# Patient Record
Sex: MALE | Race: WHITE
[De-identification: names, ages, dates, MRNs, and addresses within clinical notes are randomized per-mention and may not be internally consistent; named-entity substitution may affect disease eponyms.]

---

## 2018-01-15 ENCOUNTER — HOSPITAL ENCOUNTER (EMERGENCY)
Dept: HOSPITAL 17 - PHED | Age: 38
Discharge: HOME | End: 2018-01-15
Payer: COMMERCIAL

## 2018-01-15 VITALS
DIASTOLIC BLOOD PRESSURE: 111 MMHG | RESPIRATION RATE: 20 BRPM | SYSTOLIC BLOOD PRESSURE: 172 MMHG | HEART RATE: 87 BPM | OXYGEN SATURATION: 99 % | TEMPERATURE: 98.3 F

## 2018-01-15 VITALS — DIASTOLIC BLOOD PRESSURE: 95 MMHG | SYSTOLIC BLOOD PRESSURE: 155 MMHG

## 2018-01-15 DIAGNOSIS — N28.89: Primary | ICD-10-CM

## 2018-01-15 LAB
BACTERIA #/AREA URNS HPF: (no result) /HPF
BASOPHILS # BLD AUTO: 0 TH/MM3 (ref 0–0.2)
BASOPHILS NFR BLD: 0.3 % (ref 0–2)
BUN SERPL-MCNC: 13 MG/DL (ref 7–18)
CALCIUM SERPL-MCNC: 9 MG/DL (ref 8.5–10.1)
CHLORIDE SERPL-SCNC: 104 MEQ/L (ref 98–107)
COLOR UR: YELLOW
CREAT SERPL-MCNC: 0.97 MG/DL (ref 0.6–1.3)
EOSINOPHIL # BLD: 0.1 TH/MM3 (ref 0–0.4)
EOSINOPHIL NFR BLD: 0.9 % (ref 0–4)
ERYTHROCYTE [DISTWIDTH] IN BLOOD BY AUTOMATED COUNT: 13.4 % (ref 11.6–17.2)
GFR SERPLBLD BASED ON 1.73 SQ M-ARVRAT: 87 ML/MIN (ref 89–?)
GLUCOSE SERPL-MCNC: 97 MG/DL (ref 74–106)
GLUCOSE UR STRIP-MCNC: (no result) MG/DL
HCO3 BLD-SCNC: 27.7 MEQ/L (ref 21–32)
HCT VFR BLD CALC: 45.6 % (ref 39–51)
HGB BLD-MCNC: 14.2 GM/DL (ref 13–17)
HGB UR QL STRIP: (no result)
KETONES UR STRIP-MCNC: (no result) MG/DL
LEUKOCYTE ESTERASE UR QL STRIP: (no result) /HPF (ref 0–5)
LYMPHOCYTES # BLD AUTO: 1.6 TH/MM3 (ref 1–4.8)
LYMPHOCYTES NFR BLD AUTO: 19.9 % (ref 9–44)
MCH RBC QN AUTO: 25 PG (ref 27–34)
MCHC RBC AUTO-ENTMCNC: 31.2 % (ref 32–36)
MCV RBC AUTO: 80.1 FL (ref 80–100)
MONOCYTE #: 0.6 TH/MM3 (ref 0–0.9)
MONOCYTES NFR BLD: 7.5 % (ref 0–8)
NEUTROPHILS # BLD AUTO: 5.8 TH/MM3 (ref 1.8–7.7)
NEUTROPHILS NFR BLD AUTO: 71.4 % (ref 16–70)
NITRITE UR QL STRIP: (no result)
PLATELET # BLD: 270 TH/MM3 (ref 150–450)
PMV BLD AUTO: 8.6 FL (ref 7–11)
RBC # BLD AUTO: 5.7 MIL/MM3 (ref 4.5–5.9)
RBC #/AREA URNS HPF: (no result) /HPF (ref 0–3)
SODIUM SERPL-SCNC: 139 MEQ/L (ref 136–145)
SP GR UR STRIP: 1.02 (ref 1–1.03)
SQUAMOUS #/AREA URNS HPF: (no result) /HPF (ref 0–5)
URINE LEUKOCYTE ESTERASE: (no result)
WBC # BLD AUTO: 8.1 TH/MM3 (ref 4–11)

## 2018-01-15 PROCEDURE — 85025 COMPLETE CBC W/AUTO DIFF WBC: CPT

## 2018-01-15 PROCEDURE — 81001 URINALYSIS AUTO W/SCOPE: CPT

## 2018-01-15 PROCEDURE — 99284 EMERGENCY DEPT VISIT MOD MDM: CPT

## 2018-01-15 PROCEDURE — 80048 BASIC METABOLIC PNL TOTAL CA: CPT

## 2018-01-15 PROCEDURE — 74177 CT ABD & PELVIS W/CONTRAST: CPT

## 2018-01-15 NOTE — RADRPT
EXAM DATE/TIME:  01/15/2018 12:47 

 

[facility] COMPARISON:     No previous studies available for comparison.

 

INDICATIONS :     Fell four days ago. Left upper quadrant pain.

                      

IV CONTRAST:     95 cc Omnipaque 350 (iohexol) IV 

 

ORAL CONTRAST:      No oral contrast ingested.

                      

RADIATION DOSE:     22.41 CTDIvol (mGy) 

 

MEDICAL HISTORY :     None  

SURGICAL HISTORY :      None. 

ENCOUNTER:      Initial

ACUITY:      4 - 6 days

PAIN SCALE:      6/10

LOCATION:       Left upper quadrant 

 

TECHNIQUE:     Volumetric scanning of the abdomen and pelvis was performed.  Using automated exposure
 control and adjustment of the mA and/or kV according to patient size, radiation dose was kept as low
 as reasonably achievable to obtain optimal diagnostic quality images.  DICOM format image data is av
ailable electronically for review and comparison.  

 

FINDINGS:     

LOWER LUNGS:     The visualized lower lungs are clear.

LIVER:     Homogeneous density without lesion.  There is no dilation of the biliary tree.  No calcifi
ed gallstones. Gallbladder seen as a luminal structure without wall thickening or stones

SPLEEN:     Normal size without lesion.

PANCREAS:     Within normal limits.

KIDNEYS:     Right kidney is normal. The left kidney reveals an upper pole mixed density solid mass 7
.4 x 6.3 cm in size which is most consistent with renal cell carcinoma..

ADRENAL GLANDS:     Within normal limits.

VASCULAR:     There is no aortic aneurysm.

BOWEL/MESENTERY:     The stomach, small bowel, and colon demonstrate no acute abnormality.  There is 
no free intraperitoneal air or fluid.

ABDOMINAL WALL:     Within normal limits.

RETROPERITONEUM:     There is no lymphadenopathy. 

BLADDER:     No wall thickening or mass. 

REPRODUCTIVE:     Within normal limits.

INGUINAL:     There is no lymphadenopathy or hernia. 

MUSCULOSKELETAL:     Within normal limits for patient age. 

 

CONCLUSION:     7.4 cm solid mass upper pole left kidney consistent with renal cell carcinoma.

                                                 Otherwise negative with no acute bony injury and no 
evidence of solid organ abnormality and no evidence of free air or free fluid. 

 

 Silver Honeycutt MD on January 15, 2018 at 13:12           

Board Certified Radiologist.

 This report was verified electronically.
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
## 2018-01-22 ENCOUNTER — HOSPITAL ENCOUNTER (OUTPATIENT)
Dept: HOSPITAL 17 - HRAD | Age: 38
Discharge: HOME | End: 2018-01-22
Attending: UROLOGY
Payer: COMMERCIAL

## 2018-01-22 VITALS
SYSTOLIC BLOOD PRESSURE: 128 MMHG | OXYGEN SATURATION: 95 % | DIASTOLIC BLOOD PRESSURE: 75 MMHG | HEART RATE: 77 BPM | RESPIRATION RATE: 20 BRPM

## 2018-01-22 VITALS
DIASTOLIC BLOOD PRESSURE: 102 MMHG | TEMPERATURE: 98.2 F | HEART RATE: 105 BPM | SYSTOLIC BLOOD PRESSURE: 147 MMHG | OXYGEN SATURATION: 98 % | RESPIRATION RATE: 20 BRPM

## 2018-01-22 VITALS
TEMPERATURE: 97.7 F | DIASTOLIC BLOOD PRESSURE: 87 MMHG | RESPIRATION RATE: 20 BRPM | OXYGEN SATURATION: 93 % | SYSTOLIC BLOOD PRESSURE: 144 MMHG | HEART RATE: 87 BPM

## 2018-01-22 VITALS
RESPIRATION RATE: 20 BRPM | DIASTOLIC BLOOD PRESSURE: 72 MMHG | HEART RATE: 72 BPM | OXYGEN SATURATION: 97 % | SYSTOLIC BLOOD PRESSURE: 96 MMHG

## 2018-01-22 VITALS
HEART RATE: 60 BPM | DIASTOLIC BLOOD PRESSURE: 67 MMHG | OXYGEN SATURATION: 97 % | SYSTOLIC BLOOD PRESSURE: 109 MMHG | RESPIRATION RATE: 20 BRPM

## 2018-01-22 VITALS
RESPIRATION RATE: 20 BRPM | HEART RATE: 76 BPM | OXYGEN SATURATION: 98 % | DIASTOLIC BLOOD PRESSURE: 78 MMHG | SYSTOLIC BLOOD PRESSURE: 94 MMHG

## 2018-01-22 VITALS
RESPIRATION RATE: 20 BRPM | DIASTOLIC BLOOD PRESSURE: 91 MMHG | SYSTOLIC BLOOD PRESSURE: 122 MMHG | OXYGEN SATURATION: 93 % | HEART RATE: 65 BPM

## 2018-01-22 VITALS
SYSTOLIC BLOOD PRESSURE: 111 MMHG | DIASTOLIC BLOOD PRESSURE: 77 MMHG | HEART RATE: 78 BPM | OXYGEN SATURATION: 97 % | RESPIRATION RATE: 20 BRPM

## 2018-01-22 VITALS — HEIGHT: 74 IN | WEIGHT: 279.99 LBS | BODY MASS INDEX: 35.93 KG/M2

## 2018-01-22 VITALS
OXYGEN SATURATION: 98 % | RESPIRATION RATE: 20 BRPM | DIASTOLIC BLOOD PRESSURE: 75 MMHG | SYSTOLIC BLOOD PRESSURE: 106 MMHG | HEART RATE: 77 BPM

## 2018-01-22 DIAGNOSIS — I10: ICD-10-CM

## 2018-01-22 DIAGNOSIS — C64.9: Primary | ICD-10-CM

## 2018-01-22 LAB
BASOPHILS # BLD AUTO: 0 TH/MM3 (ref 0–0.2)
BASOPHILS # BLD AUTO: 0 TH/MM3 (ref 0–0.2)
BASOPHILS NFR BLD: 0.2 % (ref 0–2)
BASOPHILS NFR BLD: 0.4 % (ref 0–2)
EOSINOPHIL # BLD: 0.1 TH/MM3 (ref 0–0.4)
EOSINOPHIL # BLD: 0.2 TH/MM3 (ref 0–0.4)
EOSINOPHIL NFR BLD: 1.7 % (ref 0–4)
EOSINOPHIL NFR BLD: 1.9 % (ref 0–4)
ERYTHROCYTE [DISTWIDTH] IN BLOOD BY AUTOMATED COUNT: 14.2 % (ref 11.6–17.2)
ERYTHROCYTE [DISTWIDTH] IN BLOOD BY AUTOMATED COUNT: 14.4 % (ref 11.6–17.2)
HCT VFR BLD CALC: 41.1 % (ref 39–51)
HCT VFR BLD CALC: 42 % (ref 39–51)
HGB BLD-MCNC: 13.9 GM/DL (ref 13–17)
HGB BLD-MCNC: 14.1 GM/DL (ref 13–17)
INR PPP: 1.1 RATIO
LYMPHOCYTES # BLD AUTO: 1.5 TH/MM3 (ref 1–4.8)
LYMPHOCYTES # BLD AUTO: 1.5 TH/MM3 (ref 1–4.8)
LYMPHOCYTES NFR BLD AUTO: 17.4 % (ref 9–44)
LYMPHOCYTES NFR BLD AUTO: 18.5 % (ref 9–44)
MCH RBC QN AUTO: 26.4 PG (ref 27–34)
MCH RBC QN AUTO: 27.3 PG (ref 27–34)
MCHC RBC AUTO-ENTMCNC: 33 % (ref 32–36)
MCHC RBC AUTO-ENTMCNC: 34.4 % (ref 32–36)
MCV RBC AUTO: 79.5 FL (ref 80–100)
MCV RBC AUTO: 79.8 FL (ref 80–100)
MONOCYTE #: 0.5 TH/MM3 (ref 0–0.9)
MONOCYTE #: 0.6 TH/MM3 (ref 0–0.9)
MONOCYTES NFR BLD: 6.3 % (ref 0–8)
MONOCYTES NFR BLD: 8.2 % (ref 0–8)
NEUTROPHILS # BLD AUTO: 5.6 TH/MM3 (ref 1.8–7.7)
NEUTROPHILS # BLD AUTO: 6.4 TH/MM3 (ref 1.8–7.7)
NEUTROPHILS NFR BLD AUTO: 71.2 % (ref 16–70)
NEUTROPHILS NFR BLD AUTO: 74.2 % (ref 16–70)
PLATELET # BLD: 234 TH/MM3 (ref 150–450)
PLATELET # BLD: 236 TH/MM3 (ref 150–450)
PMV BLD AUTO: 8.3 FL (ref 7–11)
PMV BLD AUTO: 9 FL (ref 7–11)
PROTHROMBIN TIME: 10.7 SEC (ref 9.8–11.6)
RBC # BLD AUTO: 5.17 MIL/MM3 (ref 4.5–5.9)
RBC # BLD AUTO: 5.26 MIL/MM3 (ref 4.5–5.9)
WBC # BLD AUTO: 7.9 TH/MM3 (ref 4–11)
WBC # BLD AUTO: 8.6 TH/MM3 (ref 4–11)

## 2018-01-22 PROCEDURE — 50200 RENAL BIOPSY PERQ: CPT

## 2018-01-22 PROCEDURE — 85610 PROTHROMBIN TIME: CPT

## 2018-01-22 PROCEDURE — 77012 CT SCAN FOR NEEDLE BIOPSY: CPT

## 2018-01-22 PROCEDURE — 85025 COMPLETE CBC W/AUTO DIFF WBC: CPT

## 2018-01-22 PROCEDURE — 88307 TISSUE EXAM BY PATHOLOGIST: CPT

## 2018-01-22 PROCEDURE — 85730 THROMBOPLASTIN TIME PARTIAL: CPT

## 2018-01-22 PROCEDURE — 88305 TISSUE EXAM BY PATHOLOGIST: CPT

## 2018-01-22 NOTE — RADRPT
EXAM DATE/TIME:  01/22/2018 11:16 

 

[facility] COMPARISON:     

No previous studies available for comparison.

 

 

 

INDICATIONS :      

Left renal mass. 

                     

 

 

 

SEDATION TIME:       

20 minutes

 

 

BIOPSY SITE:       

Left kidney mass

                     

 

MEDICATION(S):

1.) 6 mg midazolam (Versed) IV  

2.) 300 mcg fentanyl (Sublimaze) IV  

 

 

DEVICE(S):

1.) 17 gauge  coaxial 15cm

2.) 18 gauge Biopsy gun 20cm

                     

 

MEDICAL HISTORY :     

None.   

 

SURGICAL HISTORY :     

None.   

 

ENCOUNTER:     

Initial

 

ACUITY:     

1 day

 

PAIN SCORE:     

0/10

 

LOCATION:     

Left flank

                     

A total of three core specimen(s) were obtained and sent to the laboratory for pathologic evaluation.


 

 

PROCEDURE:

1.   CT guided renal biopsy.

2.   Conscious sedation with continuous EKG and oximetry monitoring.

 

Prior to the procedure informed consent was obtained.  Any appropriate prior imaging studies were rev
iewed. 

Using automated exposure control and adjustment of the mA and/or kV according to patient size, radiat
ion dose was kept as low as reasonably achievable to obtain optimal diagnostic quality images.  DICOM
 format image data is available electronically for review and comparison.   

 

 

The site was prepped in a sterile fashion.  Full sterile technique was used, including cap, mask, patrice
rile gloves and gown and a large sterile sheet.  Hand hygiene and 2% chlorhexidine and/or betadine/al
cohol prep was utilized per protocol for cutaneous antisepsis.  The skin and subcutaneous tissues wer
e infiltrated with local anesthetic solution.

 

With CT guidance the large left upper pole renal mass was localized. Biopsy was performed using the p
rescribed needle as above.  Adequate hemostasis was obtained utilizing Gelfoam and thrombin.

 

Follow-up CT scan reveals no hemorrhage.

 

The patient tolerated the procedure well and there were no complications. The patient was returned to
 the Radiology Outpatient Unit in stable condition.

 

CONCLUSION:     

Uncomplicated CT guided core biopsy of a left upper pole renal mass.

 

 

 

 Ford Pressley Jr., MD on January 22, 2018 at 14:12           

Board Certified Radiologist.

 This report was verified electronically.

## 2018-01-22 NOTE — PD.RAD
Post CT Procedure Prog Note


Pre Procedure Diagnosis:  


(1) Left renal mass


Post Procedure Diagnosis:  


(1) Left renal mass


Procedure Date:


Jan 22, 2018


Supervising Radiologist:


Ford Pressley JR


Anesthesia:  Conscious Sedation


Plan of Activity


Patient to Unit:  [facility]


Patient Condition:  Good


See PACS Report for procedural detail/treatment





Biopsy


Imaging Guidance:  CT


Side:  Left


Biopsy Procedure:  Kidney


Specimen:  Core Biopsy


Findings:


3 core samples of large left upper pole renal mass performed. Good samples 

noted. Gelfoam/thrombin utilized. Post CT images show no significant hemorrhage.











Jr. Huan,Ford Rosas MD Jan 22, 2018 11:44

## 2018-02-13 ENCOUNTER — HOSPITAL ENCOUNTER (INPATIENT)
Dept: HOSPITAL 17 - HSDI | Age: 38
LOS: 2 days | Discharge: HOME | DRG: 658 | End: 2018-02-15
Attending: UROLOGY | Admitting: UROLOGY
Payer: COMMERCIAL

## 2018-02-13 VITALS
DIASTOLIC BLOOD PRESSURE: 68 MMHG | SYSTOLIC BLOOD PRESSURE: 131 MMHG | RESPIRATION RATE: 17 BRPM | TEMPERATURE: 97.3 F | OXYGEN SATURATION: 97 % | HEART RATE: 104 BPM

## 2018-02-13 VITALS — WEIGHT: 282.41 LBS | HEIGHT: 74 IN | BODY MASS INDEX: 36.24 KG/M2

## 2018-02-13 DIAGNOSIS — C64.2: Primary | ICD-10-CM

## 2018-02-13 DIAGNOSIS — I10: ICD-10-CM

## 2018-02-13 PROCEDURE — 86850 RBC ANTIBODY SCREEN: CPT

## 2018-02-13 PROCEDURE — 86900 BLOOD TYPING SEROLOGIC ABO: CPT

## 2018-02-13 PROCEDURE — 93005 ELECTROCARDIOGRAM TRACING: CPT

## 2018-02-13 PROCEDURE — 80048 BASIC METABOLIC PNL TOTAL CA: CPT

## 2018-02-13 PROCEDURE — 0TT14ZZ RESECTION OF LEFT KIDNEY, PERCUTANEOUS ENDOSCOPIC APPROACH: ICD-10-PCS | Performed by: UROLOGY

## 2018-02-13 PROCEDURE — 94150 VITAL CAPACITY TEST: CPT

## 2018-02-13 PROCEDURE — 8E0W4CZ ROBOTIC ASSISTED PROCEDURE OF TRUNK REGION, PERCUTANEOUS ENDOSCOPIC APPROACH: ICD-10-PCS | Performed by: UROLOGY

## 2018-02-13 PROCEDURE — 88307 TISSUE EXAM BY PATHOLOGIST: CPT

## 2018-02-13 PROCEDURE — 86920 COMPATIBILITY TEST SPIN: CPT

## 2018-02-13 PROCEDURE — 86901 BLOOD TYPING SEROLOGIC RH(D): CPT

## 2018-02-13 PROCEDURE — 85014 HEMATOCRIT: CPT

## 2018-02-13 PROCEDURE — 85018 HEMOGLOBIN: CPT

## 2018-02-13 RX ADMIN — CEFAZOLIN SODIUM SCH MLS/HR: 2 SOLUTION INTRAVENOUS at 22:52

## 2018-02-13 RX ADMIN — Medication SCH ML: at 22:56

## 2018-02-13 RX ADMIN — HYDROMORPHONE HCL-SODIUM CHLORIDE 0.9% INJ 6 MG/30ML SCH MG: 0.2 SOLUTION at 17:02

## 2018-02-13 RX ADMIN — HYDROMORPHONE HYDROCHLORIDE PRN MG: 2 INJECTION INTRAMUSCULAR; INTRAVENOUS; SUBCUTANEOUS at 17:59

## 2018-02-13 RX ADMIN — HYDROMORPHONE HYDROCHLORIDE PRN MG: 2 INJECTION INTRAMUSCULAR; INTRAVENOUS; SUBCUTANEOUS at 17:09

## 2018-02-13 RX ADMIN — HYDROMORPHONE HYDROCHLORIDE PRN MG: 2 INJECTION INTRAMUSCULAR; INTRAVENOUS; SUBCUTANEOUS at 16:14

## 2018-02-13 RX ADMIN — OXYTOCIN SCH MLS/HR: 10 INJECTION, SOLUTION INTRAMUSCULAR; INTRAVENOUS at 15:50

## 2018-02-13 RX ADMIN — OXYTOCIN SCH MLS/HR: 10 INJECTION, SOLUTION INTRAMUSCULAR; INTRAVENOUS at 22:53

## 2018-02-13 NOTE — PD.OP
__________________________________________________





Operative Report


Date of Surgery:  Feb 13, 2018


Preoperative Diagnosis:  


(1) Renal cell carcinoma of left kidney


Postoperative Diagnosis:  


(1) Renal cell carcinoma of left kidney


Procedure:


Robot-assisted laparoscopic left radical nephrectomy


Anesthesia:


General


Surgeon:


Matt Hunter


Assistant(s):


Clemente Youngblood


Operation and Findings:


Indication for procedure: Case of a pleasant 37-year-old gentleman with an 8 cm 

left renal mass biopsy proven to be renal cell carcinoma who presents today to 

undergo a robot-assisted laparoscopic left radical nephrectomy.





Operative procedure in detail: Patient was brought to the operating room suite 

and placed supine on the OR table.  He was then placed under general 

anesthesia.  He was then repositioned in the right lateral recumbent position 

and the table flexed to separate the bony pelvis from the thorax.  He was then 

held in this position utilizing a beanbag.  All pressure points were adequately 

padded.  He was then prepped and draped in normal sterile fashion.  After the 

timeout was taken, I proceeded with creation of a pneumoperitoneum utilizing 

the Veress needle in standard fashion.  The abdomen was inflated to 15 mmHg 

pressure.  The camera port was then placed in the midline utilizing the visual 

obturator.  The 3 robotic arm ports were then placed under direct vision and 

the first assistant port placed inferior to the camera port in the midline 

again under direct vision.  With all ports in place the robot was docked.  At 

this point in time I repositioned myself over at the [facility] console and Dr. Youngblood remained at the bedside to first assist.  I next proceeded with 

mobilization of the left colon.  The splenic flexure was freed and the colon 

mobilized in a medial direction to expose the retroperitoneum.  I then 

proceeded to mobilize the inferior aspect of the left kidney down to the psoas 

muscle and the ureter was identified and tracked upwards towards the hilum.  

The hilar vessels were identified and subsequently divided and ligated with the 

surgical stapling device.  I then proceeded with further mobilization of the 

kidney using sharp and blunt dissection.  Particular care was taken at the 

level of the spleen to prevent any inadvertent injury to the spleen or to the 

tail of the pancreas.  The left adrenal gland was identified and very medial 

location and since this tumor was lateral decision was made to leave the 

adrenal glands intact.  After the kidney was fully mobilized and was placed in 

a Endo Catch specimen bag.  The pneumoperitoneum was dropped down to 5 mmHg and 

careful inspection was made for active bleeding.  No bleeding was noted.  Total 

blood loss was 100 cc.  The robot was then undocked and I repositioned myself 

at the bedside after I re-scrubbed.  The camera port and first assistant port 

were connected by cutting with a 15 blade in between the port sites to create a 

single incision measuring approximately 6 cm in length.  The incision was 

extended down to the underlying fascia which was sharply opened up.  The 

specimen was then removed in the Endo Catch specimen bag and sent off to 

pathology.  This midline incision was then closed utilizing #1 PDS for the deep 

fascia.  Subcutaneous tissue was reapproximated with 4-0 Vicryl and the skin 

edges reapproximated in subcuticular fashion utilizing 4-0 undyed Vicryl.  The 

remaining 3 robotic arm port sites were closed in subcuticular fashion in 

similar fashion.  Sterile dressings were placed at all wound sites.  The 

patient tolerated the procedure without complications and was transferred to 

the PACU in satisfactory condition.











Matt Hunter MD Feb 13, 2018 14:51

## 2018-02-14 VITALS
RESPIRATION RATE: 18 BRPM | DIASTOLIC BLOOD PRESSURE: 87 MMHG | HEART RATE: 91 BPM | SYSTOLIC BLOOD PRESSURE: 136 MMHG | TEMPERATURE: 96.4 F | OXYGEN SATURATION: 98 %

## 2018-02-14 VITALS
SYSTOLIC BLOOD PRESSURE: 111 MMHG | OXYGEN SATURATION: 94 % | RESPIRATION RATE: 18 BRPM | DIASTOLIC BLOOD PRESSURE: 70 MMHG | TEMPERATURE: 97.6 F | HEART RATE: 81 BPM

## 2018-02-14 VITALS
RESPIRATION RATE: 18 BRPM | DIASTOLIC BLOOD PRESSURE: 98 MMHG | HEART RATE: 96 BPM | TEMPERATURE: 98.6 F | SYSTOLIC BLOOD PRESSURE: 144 MMHG | OXYGEN SATURATION: 92 %

## 2018-02-14 VITALS
SYSTOLIC BLOOD PRESSURE: 103 MMHG | DIASTOLIC BLOOD PRESSURE: 65 MMHG | RESPIRATION RATE: 18 BRPM | TEMPERATURE: 97.9 F | OXYGEN SATURATION: 97 % | HEART RATE: 90 BPM

## 2018-02-14 VITALS
DIASTOLIC BLOOD PRESSURE: 71 MMHG | TEMPERATURE: 98.8 F | OXYGEN SATURATION: 92 % | SYSTOLIC BLOOD PRESSURE: 115 MMHG | HEART RATE: 98 BPM | RESPIRATION RATE: 18 BRPM

## 2018-02-14 VITALS
DIASTOLIC BLOOD PRESSURE: 70 MMHG | HEART RATE: 92 BPM | SYSTOLIC BLOOD PRESSURE: 110 MMHG | TEMPERATURE: 97.4 F | RESPIRATION RATE: 18 BRPM | OXYGEN SATURATION: 96 %

## 2018-02-14 LAB
BUN SERPL-MCNC: 19 MG/DL (ref 7–18)
CALCIUM SERPL-MCNC: 8.3 MG/DL (ref 8.5–10.1)
CHLORIDE SERPL-SCNC: 103 MEQ/L (ref 98–107)
CREAT SERPL-MCNC: 1.6 MG/DL (ref 0.6–1.3)
GFR SERPLBLD BASED ON 1.73 SQ M-ARVRAT: 49 ML/MIN (ref 89–?)
GLUCOSE SERPL-MCNC: 104 MG/DL (ref 74–106)
HCO3 BLD-SCNC: 29.1 MEQ/L (ref 21–32)
HCT VFR BLD CALC: 37.4 % (ref 39–51)
HGB BLD-MCNC: 12.9 GM/DL (ref 13–17)
SODIUM SERPL-SCNC: 137 MEQ/L (ref 136–145)

## 2018-02-14 RX ADMIN — ONDANSETRON PRN MG: 2 INJECTION, SOLUTION INTRAMUSCULAR; INTRAVENOUS at 07:10

## 2018-02-14 RX ADMIN — OXYTOCIN SCH MLS/HR: 10 INJECTION, SOLUTION INTRAMUSCULAR; INTRAVENOUS at 15:05

## 2018-02-14 RX ADMIN — Medication SCH ML: at 21:58

## 2018-02-14 RX ADMIN — DOCUSATE SODIUM SCH MG: 100 CAPSULE, LIQUID FILLED ORAL at 21:59

## 2018-02-14 RX ADMIN — ONDANSETRON PRN MG: 2 INJECTION, SOLUTION INTRAMUSCULAR; INTRAVENOUS at 15:04

## 2018-02-14 RX ADMIN — OXYTOCIN SCH MLS/HR: 10 INJECTION, SOLUTION INTRAMUSCULAR; INTRAVENOUS at 06:13

## 2018-02-14 RX ADMIN — CEFAZOLIN SODIUM SCH MLS/HR: 2 SOLUTION INTRAVENOUS at 06:04

## 2018-02-14 RX ADMIN — HYDROMORPHONE HCL-SODIUM CHLORIDE 0.9% INJ 6 MG/30ML SCH MG: 0.2 SOLUTION at 03:55

## 2018-02-14 RX ADMIN — HYDROMORPHONE HCL-SODIUM CHLORIDE 0.9% INJ 6 MG/30ML SCH MG: 0.2 SOLUTION at 22:53

## 2018-02-14 RX ADMIN — HYDROMORPHONE HCL-SODIUM CHLORIDE 0.9% INJ 6 MG/30ML SCH MG: 0.2 SOLUTION at 13:37

## 2018-02-14 RX ADMIN — Medication SCH ML: at 09:00

## 2018-02-14 RX ADMIN — DOCUSATE SODIUM SCH MG: 100 CAPSULE, LIQUID FILLED ORAL at 15:13

## 2018-02-14 RX ADMIN — OXYTOCIN SCH MLS/HR: 10 INJECTION, SOLUTION INTRAMUSCULAR; INTRAVENOUS at 22:00

## 2018-02-14 RX ADMIN — ONDANSETRON PRN MG: 2 INJECTION, SOLUTION INTRAMUSCULAR; INTRAVENOUS at 21:58

## 2018-02-14 RX ADMIN — LISINOPRIL SCH MG: 10 TABLET ORAL at 09:53

## 2018-02-14 RX ADMIN — CEFAZOLIN SODIUM SCH MLS/HR: 2 SOLUTION INTRAVENOUS at 13:35

## 2018-02-14 RX ADMIN — OXYCODONE HYDROCHLORIDE AND ACETAMINOPHEN PRN TAB: 7.5; 325 TABLET ORAL at 23:04

## 2018-02-14 NOTE — HHI.PR
Subjective


Patient symptoms today


Postoperative day #1


Pain management with PCA


Voiding well


Has been out of bed


Tolerating regular diet





Objective


Vital Signs





Vital Signs








  Date Time  Temp Pulse Resp B/P (MAP) Pulse Ox O2 Delivery O2 Flow Rate FiO2


 


2/14/18 08:00 97.6 81 18 111/70 (84) 94   


 


2/14/18 06:00   18     


 


2/14/18 05:17 97.9 90 18 103/65 (78) 97   


 


2/14/18 03:55   18     


 


2/14/18 00:23 97.4 92 18 110/70 (83) 96   


 


2/13/18 22:00   18     


 


2/13/18 20:31 97.3 104 17 131/68 (89) 97   


 


2/13/18 18:15 98.6 112 13 120/64 (82) 94 Nasal Cannula 2 


 


2/13/18 18:00  112 13 129/67 (87) 96 Nasal Cannula 2 


 


2/13/18 17:45  115 13 103/70 (81) 98 Nasal Cannula 3 


 


2/13/18 17:30  113 13 108/69 (82) 96 Nasal Cannula 3 


 


2/13/18 17:15  111 13 104/70 (81) 94 Nasal Cannula 3 


 


2/13/18 17:02   19     


 


2/13/18 17:00  113 14 121/85 (97) 96 Nasal Cannula 3 


 


2/13/18 16:45  117 13 131/92 (105) 98 Nasal Cannula 4 


 


2/13/18 16:30  118 13 113/74 (87) 95 Nasal Cannula 4 


 


2/13/18 16:15  120 14 123/83 (96) 96 Nasal Cannula 4 


 


2/13/18 16:00  116 21 135/83 (100) 97 Nasal Cannula 4 


 


2/13/18 15:45  113 22 130/78 (95) 96 Simple Mask 5 





    123/67 (85)    


 


2/13/18 15:30  105 24 123/75 (91) 94 Simple Mask 6 





    127/73 (91)    


 


2/13/18 15:19 98.3 116 16 109/73 (85) 91 Simple Mask 6 














Intake & Output  


 


 2/14/18 2/14/18





 07:00 19:00


 


Intake Total 1784 ml 


 


Output Total 800 ml 


 


Balance 984 ml 


 


  


 


IV Total 1784 ml 


 


Output Urine Total 800 ml 








Result Diagram:  


2/14/18 0924                                                                   

             2/14/18 0924





Other Results


Abdomen soft, nondistended, nontender


Wound sites clean and dry


Extremities well perfused, nontender


Medications and IVs





Current Medications








 Medications


  (Trade)  Dose


 Ordered  Sig/Leonel


 Route  Start Time


 Stop Time Status Last Admin


 


 Lactated Ringer's  1,000 ml @ 


 30 mls/hr  Q24H PRN


 IV  2/13/18 07:00


 2/16/18 06:59  2/13/18 07:00


 


 


 Sodium Chloride  500 ml @ 


 30 mls/hr  [id number] PRN


 IV  2/13/18 07:00


 2/16/18 06:59   


 


 


  (Lopressor)  25 mg  ON CALL  PRN


 PO  2/13/18 07:00


 2/16/18 06:59   


 


 


  (Betadine 5%


 Antisepsis Kit)  1 applic  ON CALL  PRN


 EACH NARE  2/13/18 07:00


 2/16/18 06:59  2/13/18 07:00


 


 


  (Chlorhexidine


 2% Cloth)  3 pack  ON CALL  PRN


 TOPICAL  2/13/18 07:00


 2/16/18 06:59  2/13/18 06:30


 


 


  (NovoLIN R INJ)  See


 Protocol


 Table ...  ON CALL  PRN


 SQ  2/13/18 07:00


 2/16/18 06:59   


 


 


 Cefazolin Sodium/


 Dextrose  50 ml @ 


 100 mls/hr  ON  CALL


 IV  2/13/18 07:00


 2/16/18 06:59  2/13/18 07:50


 


 


 Lactated Ringer's  1,000 ml @ 


 125 mls/hr  Q8H


 IV  2/13/18 15:15


    2/14/18 06:13


 


 


  (NS Flush)  2 ml  UNSCH  PRN


 IV FLUSH  2/13/18 15:15


     


 


 


  (NS Flush)  2 ml  BID


 IV FLUSH  2/13/18 21:00


    2/13/18 22:56


 


 


  (Dilaudid Pf Inj)  1 mg  Q2H  PRN


 IV PUSH  2/13/18 15:15


   Future Hold 2/13/18 17:59


 


 


  (Dilaudid Pf Inj)  2 mg  Q2H  PRN


 IV PUSH  2/13/18 15:15


   Future Hold  


 


 


  (Zofran Inj)  4 mg  Q6H  PRN


 IV PUSH  2/13/18 15:15


    2/14/18 07:10


 


 


  (Ultram)  50 mg  Q6H  PRN


 PO  2/14/18 08:00


   Future Hold  


 


 


  (Prinivil)  10 mg  DAILY


 PO  2/14/18 09:00


    2/14/18 09:53


 


 


 Miscellaneous


 Information  ALL


 NURSING


 DEPARTME...  UNSCH  PRN


 .XX  2/13/18 16:00


 2/14/18 15:59   


 


 


  (Dilaudid PCA


 Inj)  6 mg  UNSCH


 IV  2/13/18 19:00


    2/14/18 03:55


 


 


 PCA Dosage


 Infused (Pha)  1  Q8HR


 .XX  2/13/18 22:00


    2/14/18 06:00


 


 


  (Narcan Inj)  0.4 mg  UNSCH  PRN


 IV PUSH  2/13/18 19:00


     


 


 


 Cefazolin Sodium/


 Dextrose  50 ml @ 


 100 mls/hr  Q8H


 IV  2/13/18 23:00


 2/14/18 15:29  2/14/18 06:04


 


 


  (Percocet


 7.5-325 Mg)  1 tab  Q6H  PRN


 PO  2/14/18 12:00


   UNV  


 


 


  (Colace)  100 mg  BID


 PO  2/14/18 12:00


   UNV  


 











Assessment and Plan


Assessment and Plan


Urologic impression:


#1  Status post robot-assisted laparoscopic right radical nephrectomy


#2  Uneventful postop course





Plan:


#1  Attempt to wean off PCA


#2  Encourage out of bed


#3  Continue with regular diet


#4  [place] MD Feb 14, 2018 12:00

## 2018-02-14 NOTE — EKG
Date Performed: 02/13/2018       Time Performed: 07:18:50

 

PTAGE:      37 years

 

EKG:      Sinus rhythm 

 

 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

 

PREVIOUS TRACING       : 02/20/2013 21.30 Since prior tracing, sinus rhythm has replaced sinus rhythm
 with bigeminal PVCs. Possible inferior infarct pattern is new.

 

DOCTOR:   Hans Morris  Interpretating Date/Time  02/14/2018 09:42:33

## 2018-02-15 VITALS
HEART RATE: 87 BPM | DIASTOLIC BLOOD PRESSURE: 89 MMHG | OXYGEN SATURATION: 96 % | RESPIRATION RATE: 18 BRPM | SYSTOLIC BLOOD PRESSURE: 142 MMHG | TEMPERATURE: 96.9 F

## 2018-02-15 VITALS
SYSTOLIC BLOOD PRESSURE: 143 MMHG | DIASTOLIC BLOOD PRESSURE: 93 MMHG | OXYGEN SATURATION: 94 % | HEART RATE: 83 BPM | RESPIRATION RATE: 18 BRPM | TEMPERATURE: 97.9 F

## 2018-02-15 VITALS
RESPIRATION RATE: 18 BRPM | DIASTOLIC BLOOD PRESSURE: 85 MMHG | SYSTOLIC BLOOD PRESSURE: 133 MMHG | TEMPERATURE: 97.7 F | OXYGEN SATURATION: 91 % | HEART RATE: 78 BPM

## 2018-02-15 VITALS — RESPIRATION RATE: 16 BRPM

## 2018-02-15 RX ADMIN — OXYTOCIN SCH MLS/HR: 10 INJECTION, SOLUTION INTRAMUSCULAR; INTRAVENOUS at 06:34

## 2018-02-15 RX ADMIN — DOCUSATE SODIUM SCH MG: 100 CAPSULE, LIQUID FILLED ORAL at 08:56

## 2018-02-15 RX ADMIN — ONDANSETRON PRN MG: 2 INJECTION, SOLUTION INTRAMUSCULAR; INTRAVENOUS at 06:30

## 2018-02-15 RX ADMIN — OXYCODONE HYDROCHLORIDE AND ACETAMINOPHEN PRN TAB: 7.5; 325 TABLET ORAL at 06:45

## 2018-02-15 RX ADMIN — OXYTOCIN SCH MLS/HR: 10 INJECTION, SOLUTION INTRAMUSCULAR; INTRAVENOUS at 13:41

## 2018-02-15 RX ADMIN — LISINOPRIL SCH MG: 10 TABLET ORAL at 08:57

## 2018-02-15 RX ADMIN — OXYCODONE HYDROCHLORIDE AND ACETAMINOPHEN PRN TAB: 7.5; 325 TABLET ORAL at 12:06

## 2018-02-15 RX ADMIN — HYDROMORPHONE HCL-SODIUM CHLORIDE 0.9% INJ 6 MG/30ML SCH MG: 0.2 SOLUTION at 07:46

## 2018-02-15 RX ADMIN — Medication SCH ML: at 08:57

## 2018-02-15 NOTE — HHI.DS
Discharge Summary


Admission Date


Feb 13, 2018 at 06:16


Discharge Date:  Feb 15, 2018


Admitting Diagnosis


Left renal cell carcinoma





(1) Renal cell carcinoma of left kidney


Diagnosis:  Principal


ICD Codes:  C64.2 - Malignant neoplasm of left kidney, except renal pelvis


Procedures


Robot-assisted laparoscopic left radical nephrectomy


Brief History


Case of a 37-year-old gentleman who was recently discovered to have a 8 cm mass 

involving the left kidney biopsy proven to be renal cell carcinoma.  Patient 

was admitted on February 13 to undergo a robot-assisted laparoscopic left 

radical nephrectomy.  Please refer to admission history and physical for 

additional history and pertinent physical findings.


CBC/BMP:  


2/14/18 0924                                                                   

             2/14/18 0924





Significant Findings





Laboratory Tests








Test


  2/14/18


09:24


 


Hemoglobin


  12.9 GM/DL


(13.0-17.0)


 


Hematocrit


  37.4 %


(39.0-51.0)


 


Blood Urea Nitrogen


  19 MG/DL


(7-18)


 


Creatinine


  1.60 MG/DL


(0.60-1.30)


 


Calcium Level


  8.3 MG/DL


(8.5-10.1)


 


Estimat Glomerular Filtration


Rate 49 ML/MIN


(>89)








PE at Discharge


Abdomen soft, nondistended, nontender


Incision sites clean and dry


Extremities well perfused, nontender


Hospital Course


Patient was admitted on February 13 and underwent a formation procedures 

without complications.  Postoperative course was as expected.  By postop day 1 

the patient was started on regular diet and ambulated.  By postop day #2 pain 

was well managed with oral meds, patient ambulating well and tolerating oral 

intake.  A decision was thus made to discharge the patient home.


Pt Condition on Discharge:  Good


Discharge Disposition:  Discharge Home


Discharge Instructions


DIET: Follow Instructions for:  As Tolerated, No Restrictions


Activities you can perform:  See Additionl Instruction


Activities to avoid:  Strenuous Activity


Additional Activity Instructio:  


No strenuous activity or heavy lifting until further advised.


Additional Information


Office follow-up early next month as previously arranged.











Matt Hunter MD Feb 15, 2018 11:36